# Patient Record
Sex: FEMALE | Race: BLACK OR AFRICAN AMERICAN | HISPANIC OR LATINO | URBAN - METROPOLITAN AREA
[De-identification: names, ages, dates, MRNs, and addresses within clinical notes are randomized per-mention and may not be internally consistent; named-entity substitution may affect disease eponyms.]

---

## 2023-08-08 ENCOUNTER — EMERGENCY (EMERGENCY)
Facility: HOSPITAL | Age: 28
LOS: 1 days | Discharge: HOME | End: 2023-08-08
Attending: EMERGENCY MEDICINE
Payer: MEDICAID

## 2023-08-08 VITALS
DIASTOLIC BLOOD PRESSURE: 80 MMHG | RESPIRATION RATE: 17 BRPM | OXYGEN SATURATION: 98 % | HEIGHT: 66 IN | TEMPERATURE: 98 F | WEIGHT: 158.07 LBS | SYSTOLIC BLOOD PRESSURE: 117 MMHG | HEART RATE: 104 BPM

## 2023-08-08 LAB
ALBUMIN SERPL ELPH-MCNC: 3.4 G/DL — LOW (ref 3.5–5)
ALP SERPL-CCNC: 73 U/L — SIGNIFICANT CHANGE UP (ref 40–120)
ALT FLD-CCNC: 45 U/L DA — SIGNIFICANT CHANGE UP (ref 10–60)
ANION GAP SERPL CALC-SCNC: 6 MMOL/L — SIGNIFICANT CHANGE UP (ref 5–17)
AST SERPL-CCNC: 23 U/L — SIGNIFICANT CHANGE UP (ref 10–40)
BASOPHILS # BLD AUTO: 0.01 K/UL — SIGNIFICANT CHANGE UP (ref 0–0.2)
BASOPHILS NFR BLD AUTO: 0.2 % — SIGNIFICANT CHANGE UP (ref 0–2)
BILIRUB SERPL-MCNC: 0.2 MG/DL — SIGNIFICANT CHANGE UP (ref 0.2–1.2)
BUN SERPL-MCNC: 5 MG/DL — LOW (ref 7–18)
CALCIUM SERPL-MCNC: 8.1 MG/DL — LOW (ref 8.4–10.5)
CHLORIDE SERPL-SCNC: 107 MMOL/L — SIGNIFICANT CHANGE UP (ref 96–108)
CK SERPL-CCNC: 68 U/L — SIGNIFICANT CHANGE UP (ref 21–215)
CO2 SERPL-SCNC: 28 MMOL/L — SIGNIFICANT CHANGE UP (ref 22–31)
CREAT SERPL-MCNC: 0.61 MG/DL — SIGNIFICANT CHANGE UP (ref 0.5–1.3)
EGFR: 124 ML/MIN/1.73M2 — SIGNIFICANT CHANGE UP
EOSINOPHIL # BLD AUTO: 0.05 K/UL — SIGNIFICANT CHANGE UP (ref 0–0.5)
EOSINOPHIL NFR BLD AUTO: 1.1 % — SIGNIFICANT CHANGE UP (ref 0–6)
ERYTHROCYTE [SEDIMENTATION RATE] IN BLOOD: 27 MM/HR — HIGH (ref 0–15)
GLUCOSE SERPL-MCNC: 76 MG/DL — SIGNIFICANT CHANGE UP (ref 70–99)
HCG SERPL-ACNC: <1 MIU/ML — SIGNIFICANT CHANGE UP
HCT VFR BLD CALC: 37.9 % — SIGNIFICANT CHANGE UP (ref 34.5–45)
HGB BLD-MCNC: 12 G/DL — SIGNIFICANT CHANGE UP (ref 11.5–15.5)
IMM GRANULOCYTES NFR BLD AUTO: 0.2 % — SIGNIFICANT CHANGE UP (ref 0–0.9)
LYMPHOCYTES # BLD AUTO: 2.04 K/UL — SIGNIFICANT CHANGE UP (ref 1–3.3)
LYMPHOCYTES # BLD AUTO: 46.8 % — HIGH (ref 13–44)
MAGNESIUM SERPL-MCNC: 1.9 MG/DL — SIGNIFICANT CHANGE UP (ref 1.6–2.6)
MCHC RBC-ENTMCNC: 26 PG — LOW (ref 27–34)
MCHC RBC-ENTMCNC: 31.7 GM/DL — LOW (ref 32–36)
MCV RBC AUTO: 82.2 FL — SIGNIFICANT CHANGE UP (ref 80–100)
MONOCYTES # BLD AUTO: 0.43 K/UL — SIGNIFICANT CHANGE UP (ref 0–0.9)
MONOCYTES NFR BLD AUTO: 9.9 % — SIGNIFICANT CHANGE UP (ref 2–14)
NEUTROPHILS # BLD AUTO: 1.82 K/UL — SIGNIFICANT CHANGE UP (ref 1.8–7.4)
NEUTROPHILS NFR BLD AUTO: 41.8 % — LOW (ref 43–77)
NRBC # BLD: 0 /100 WBCS — SIGNIFICANT CHANGE UP (ref 0–0)
PLATELET # BLD AUTO: 214 K/UL — SIGNIFICANT CHANGE UP (ref 150–400)
POTASSIUM SERPL-MCNC: 3.6 MMOL/L — SIGNIFICANT CHANGE UP (ref 3.5–5.3)
POTASSIUM SERPL-SCNC: 3.6 MMOL/L — SIGNIFICANT CHANGE UP (ref 3.5–5.3)
PROT SERPL-MCNC: 7.8 G/DL — SIGNIFICANT CHANGE UP (ref 6–8.3)
RBC # BLD: 4.61 M/UL — SIGNIFICANT CHANGE UP (ref 3.8–5.2)
RBC # FLD: 14.7 % — HIGH (ref 10.3–14.5)
SODIUM SERPL-SCNC: 141 MMOL/L — SIGNIFICANT CHANGE UP (ref 135–145)
WBC # BLD: 4.36 K/UL — SIGNIFICANT CHANGE UP (ref 3.8–10.5)
WBC # FLD AUTO: 4.36 K/UL — SIGNIFICANT CHANGE UP (ref 3.8–10.5)

## 2023-08-08 PROCEDURE — 82550 ASSAY OF CK (CPK): CPT

## 2023-08-08 PROCEDURE — 85652 RBC SED RATE AUTOMATED: CPT

## 2023-08-08 PROCEDURE — 96375 TX/PRO/DX INJ NEW DRUG ADDON: CPT

## 2023-08-08 PROCEDURE — 99284 EMERGENCY DEPT VISIT MOD MDM: CPT | Mod: 25

## 2023-08-08 PROCEDURE — 83735 ASSAY OF MAGNESIUM: CPT

## 2023-08-08 PROCEDURE — 96374 THER/PROPH/DIAG INJ IV PUSH: CPT

## 2023-08-08 PROCEDURE — 70450 CT HEAD/BRAIN W/O DYE: CPT | Mod: MA

## 2023-08-08 PROCEDURE — 80053 COMPREHEN METABOLIC PANEL: CPT

## 2023-08-08 PROCEDURE — 36415 COLL VENOUS BLD VENIPUNCTURE: CPT

## 2023-08-08 PROCEDURE — 70450 CT HEAD/BRAIN W/O DYE: CPT | Mod: 26,MA

## 2023-08-08 PROCEDURE — 99284 EMERGENCY DEPT VISIT MOD MDM: CPT

## 2023-08-08 PROCEDURE — 84702 CHORIONIC GONADOTROPIN TEST: CPT

## 2023-08-08 PROCEDURE — 85025 COMPLETE CBC W/AUTO DIFF WBC: CPT

## 2023-08-08 RX ORDER — SODIUM CHLORIDE 9 MG/ML
1000 INJECTION INTRAMUSCULAR; INTRAVENOUS; SUBCUTANEOUS
Refills: 0 | Status: DISCONTINUED | OUTPATIENT
Start: 2023-08-08 | End: 2023-08-12

## 2023-08-08 RX ORDER — METOCLOPRAMIDE HCL 10 MG
10 TABLET ORAL ONCE
Refills: 0 | Status: COMPLETED | OUTPATIENT
Start: 2023-08-08 | End: 2023-08-08

## 2023-08-08 RX ORDER — DIPHENHYDRAMINE HCL 50 MG
25 CAPSULE ORAL ONCE
Refills: 0 | Status: COMPLETED | OUTPATIENT
Start: 2023-08-08 | End: 2023-08-08

## 2023-08-08 RX ADMIN — SODIUM CHLORIDE 125 MILLILITER(S): 9 INJECTION INTRAMUSCULAR; INTRAVENOUS; SUBCUTANEOUS at 19:12

## 2023-08-08 RX ADMIN — SODIUM CHLORIDE 125 MILLILITER(S): 9 INJECTION INTRAMUSCULAR; INTRAVENOUS; SUBCUTANEOUS at 23:38

## 2023-08-08 RX ADMIN — Medication 25 MILLIGRAM(S): at 23:36

## 2023-08-08 RX ADMIN — Medication 104 MILLIGRAM(S): at 23:33

## 2023-08-08 NOTE — ED PROVIDER NOTE - NSFOLLOWUPINSTRUCTIONS_ED_ALL_ED_FT
Cefalea migrañosa  Migraine Headache  Helga cefalea migrañosa es un dolor intenso y punzante en jhonatan o ambos lados de la christophe. Las cefaleas migrañosas también pueden causar otros síntomas, valeria náuseas, vómitos y sensibilidad a la aleah y el ruido. Helga cefalea migrañosa puede durar desde 4 horas hasta 3 días. Hable con villa médico sobre los factores que pueden causar (desencadenar) las cefaleas migrañosas.    ¿Cuáles son las causas?  Se desconoce la causa exacta de esta afección. Sin embargo, helga migraña puede aparecer cuando los nervios del cerebro se irritan y liberan ciertas sustancias químicas que causan inflamación de los vasos sanguíneos. Esta inflamación provoca dolor. Esta afección puede desencadenarse o ser causada por lo siguiente:  Consumo de alcohol.  Consumo de cigarrillos.  Janet medicamentos valeria por ejemplo:  Medicamentos para aliviar el dolor torácico (nitroglicerina).  Anticonceptivos orales.  Estrógeno.  Ciertos medicamentos para la presión arterial.  [place] o beber productos que contienen nitratos, glutamato, aspartamo o tiramina. Los quesos añejados, el chocolate o la cafeína también pueden ser desencadenantes.  Hacer actividad física.  Otros factores que pueden provocar cefalea migrañosa son los siguientes:  Menstruación.  Embarazo.  Hambre.  Estrés.  Dormir poco o dormir demasiado.  Cambios climáticos.  Fatiga.  ¿Qué incrementa el riesgo?  Los siguientes factores pueden hacer que usted sea más propenso a tener migrañas:  Tener cierta edad. Es más probable que esta afección se manifieste en personas que tienen entre 25 y 55 años.  Ser jose alejandro.  Tener antecedentes familiares de cefalea migrañosa.  Ser de chevy caucásica.  Tener helga afección de nixon mental, valeria depresión o ansiedad.  Ser guy.  ¿Cuáles son los signos o los síntomas?  El principal síntoma de esta afección es el dolor intenso y punzante. Shazia dolor puede tener las siguientes características:  Puede aparecer en cualquier región de la christophe, tanto de un lado valeria de ambos.  Puede interferir con las actividades de la guevara cotidiana.  Puede empeorar con la actividad física.  Puede empeorar ante la exposición a luces brillantes o a ruidos amilcar.  Otros síntomas pueden incluir:  Náuseas.  Vómitos.  Mareos.  Sensibilidad general a las luces brillantes, a los ruidos amilcar o a los olores.  Antes de tener helga cefalea migrañosa, puede recibir señales de advertencia (aura). Un aura puede incluir:  Juan luces intermitentes o tener puntos ciegos.  Juan puntos brillantes, halos o líneas en zigzag.  Tener helga visión en túnel o visión borrosa.  Sentir entumecimiento u hormigueo.  Tener dificultad para hablar.  Debilidad muscular.  Algunas personas tienen síntomas después de helga cefalea migrañosa (fase posdromal), valeria los siguientes:  Cansancio.  Dificultad para concentrarte.  ¿Cómo se diagnostica?  La cefalea migrañosa se diagnostica en función de lo siguiente:  Essence síntomas.  Un examen físico.  Pruebas, valeria, por ejemplo:  Tomografía computarizada (TC) o resonancia magnética (RM) de la christophe. Estos estudios de diagnóstico por imágenes pueden ayudar a descartar otras causas de cefalea.  Janet helga muestra de líquido de la médula padilla (punción lumbar) para analizar (análisis de líquido cefalorraquídeo o análisis de LCR).  ¿Cómo se trata?  Esta afección se puede tratar con medicamentos para:  Aliviar el dolor.  Aliviar las náuseas.  Prevenir las cefaleas migrañosas.  El tratamiento de esta afección también puede incluir lo siguiente:  Acupuntura.  Cambios en el estilo de guevara, valeria evitar los alimentos que provocan las cefaleas migrañosas.  Biorretroalimentación.  Terapia cognitiva conductual.  Siga estas instrucciones en villa casa:  Medicamentos    Use los medicamentos de venta binta y los recetados solamente valeria se lo haya indicado el médico.  Pregúntele al médico si el medicamento recetado:  Hace que sea necesario que evite conducir o usar maquinaria pesada.  Puede causarle estreñimiento. Es posible que tenga que janet estas medidas para prevenir o tratar el estreñimiento:  Beber suficiente líquido valeria para mantener la orina de color amarillo pálido.  Janet medicamentos recetados o de venta binta.  Consumir alimentos ricos en fibra, valeria frijoles, cereales integrales, y frutas y verduras frescas.  Limitar el consumo de alimentos ricos en grasa y azúcares procesados, valeria los alimentos fritos o dulces.  Estilo de guevara    No christelle alcohol.  No consuma ningún producto que contenga nicotina o tabaco, valeria cigarrillos, cigarrillos electrónicos y tabaco de mascar. Si necesita ayuda para dejar de fumar, consulte al médico.  Duerma valeria mínimo 8 horas todas las noches.  Encuentre modos de manejar el estrés, por ejemplo, a través de la meditación, la respiración profunda o el yoga.  Indicaciones generales        Lleve un registro diario para averiguar lo que puede provocar las cefaleas migrañosas. Registre, por ejemplo, lo siguiente:  Lo que usted come y vahe.  El tiempo que duerme.  Algún cambio en villa dieta o en los medicamentos.  Si tiene helga cefalea migrañosa:  Evite los factores que empeoren los síntomas, valeria las luces brillantes.  Resulta útil acostarse en helga habitación oscura y silenciosa.  No conduzca vehículos ni opere maquinaria pesada.  Pregúntele al médico qué actividades son seguras para usted cuando tiene síntomas.  Concurra a todas las visitas de seguimiento valeria se lo haya indicado el médico. [place] es importante.  Comuníquese con un médico si:  Tiene síntomas de cefalea migrañosa que son distintos o más intensos que los habituales.  Tiene más de 15 días de cefalea por mes.  Solicite ayuda inmediatamente si:  La cefalea migrañosa se vuelve cada vez más intensa.  La cefalea migrañosa dura más de 72 horas.  Tiene fiebre.  Presenta rigidez en el juany.  Presenta pérdida de la visión.  Siente debilidad en los músculos o que no puede controlarlos.  Comienza a perder el equilibrio con frecuencia.  Presenta dificultad para caminar.  Se desmaya.  Tiene helga convulsión.  Resumen  Helga cefalea migrañosa es un dolor intenso y punzante en jhonatan o ambos lados de la christophe. Las migrañas también pueden causar otros síntomas, valeria náuseas, vómitos y sensibilidad a la aleah y el ruido.  Esta afección puede tratarse con medicamentos y cambios en el estilo de guevara. También es posible que deba evitar ciertos factores que desencadenan helga cefalea migrañosa.  Lleve un registro diario para averiguar lo que puede provocar las cefaleas migrañosas.  Comuníquese con el médico si tiene más de 15 días de cefalea en un mes o presenta síntomas de cefalea migrañosa que son distintos o más intensos que los habituales.  Esta información no tiene valeria fin reemplazar el consejo del médico. Asegúrese de hacerle al médico cualquier pregunta que tenga. Cefalea migrañosa  Migraine Headache  Helga cefalea migrañosa es un dolor intenso y punzante en jhonatan o ambos lados de la christophe. Las cefaleas migrañosas también pueden causar otros síntomas, valeria náuseas, vómitos y sensibilidad a la aleah y el ruido. Helga cefalea migrañosa puede durar desde 4 horas hasta 3 días. Hable con villa médico sobre los factores que pueden causar (desencadenar) las cefaleas migrañosas.    ¿Cuáles son las causas?  Se desconoce la causa exacta de esta afección. Sin embargo, helga migraña puede aparecer cuando los nervios del cerebro se irritan y liberan ciertas sustancias químicas que causan inflamación de los vasos sanguíneos. Esta inflamación provoca dolor. Esta afección puede desencadenarse o ser causada por lo siguiente:  Consumo de alcohol.  Consumo de cigarrillos.  Janet medicamentos valeria por ejemplo:  Medicamentos para aliviar el dolor torácico (nitroglicerina).  Anticonceptivos orales.  Estrógeno.  Ciertos medicamentos para la presión arterial.  [place] o beber productos que contienen nitratos, glutamato, aspartamo o tiramina. Los quesos añejados, el chocolate o la cafeína también pueden ser desencadenantes.  Hacer actividad física.  Otros factores que pueden provocar cefalea migrañosa son los siguientes:  Menstruación.  Embarazo.  Hambre.  Estrés.  Dormir poco o dormir demasiado.  Cambios climáticos.  Fatiga.  ¿Qué incrementa el riesgo?  Los siguientes factores pueden hacer que usted sea más propenso a tener migrañas:  Tener cierta edad. Es más probable que esta afección se manifieste en personas que tienen entre 25 y 55 años.  Ser jose alejandro.  Tener antecedentes familiares de cefalea migrañosa.  Ser de chevy caucásica.  Tener helga afección de nixon mental, valeria depresión o ansiedad.  Ser guy.  ¿Cuáles son los signos o los síntomas?  El principal síntoma de esta afección es el dolor intenso y punzante. Shazia dolor puede tener las siguientes características:  Puede aparecer en cualquier región de la christophe, tanto de un lado valeria de ambos.  Puede interferir con las actividades de la guevara cotidiana.  Puede empeorar con la actividad física.  Puede empeorar ante la exposición a luces brillantes o a ruidos amilcar.  Otros síntomas pueden incluir:  Náuseas.  Vómitos.  Mareos.  Sensibilidad general a las luces brillantes, a los ruidos amilcar o a los olores.  Antes de tener helga cefalea migrañosa, puede recibir señales de advertencia (aura). Un aura puede incluir:  Juan luces intermitentes o tener puntos ciegos.  Juan puntos brillantes, halos o líneas en zigzag.  Tener helga visión en túnel o visión borrosa.  Sentir entumecimiento u hormigueo.  Tener dificultad para hablar.  Debilidad muscular.  Algunas personas tienen síntomas después de helga cefalea migrañosa (fase posdromal), valeria los siguientes:  Cansancio.  Dificultad para concentrarte.  ¿Cómo se diagnostica?  La cefalea migrañosa se diagnostica en función de lo siguiente:  Essence síntomas.  Un examen físico.  Pruebas, valeria, por ejemplo:  Tomografía computarizada (TC) o resonancia magnética (RM) de la christophe. Estos estudios de diagnóstico por imágenes pueden ayudar a descartar otras causas de cefalea.  Janet helga muestra de líquido de la médula padilla (punción lumbar) para analizar (análisis de líquido cefalorraquídeo o análisis de LCR).  ¿Cómo se trata?  Esta afección se puede tratar con medicamentos para:  Aliviar el dolor.  Aliviar las náuseas.  Prevenir las cefaleas migrañosas.  El tratamiento de esta afección también puede incluir lo siguiente:  Acupuntura.  Cambios en el estilo de guevara, valeria evitar los alimentos que provocan las cefaleas migrañosas.  Biorretroalimentación.  Terapia cognitiva conductual.  Siga estas instrucciones en villa casa:  Medicamentos    Use los medicamentos de venta binta y los recetados solamente valeria se lo haya indicado el médico.  Pregúntele al médico si el medicamento recetado:  Hace que sea necesario que evite conducir o usar maquinaria pesada.  Puede causarle estreñimiento. Es posible que tenga que janet estas medidas para prevenir o tratar el estreñimiento:  Beber suficiente líquido valeria para mantener la orina de color amarillo pálido.  Janet medicamentos recetados o de venta binta.  Consumir alimentos ricos en fibra, valeria frijoles, cereales integrales, y frutas y verduras frescas.  Limitar el consumo de alimentos ricos en grasa y azúcares procesados, valeria los alimentos fritos o dulces.  Estilo de guevara    No nicky alcohol.  No consuma ningún producto que contenga nicotina o tabaco, valeria cigarrillos, cigarrillos electrónicos y tabaco de mascar. Si necesita ayuda para dejar de fumar, consulte al médico.  Duerma valeria mínimo 8 horas todas las noches.  Encuentre modos de manejar el estrés, por ejemplo, a través de la meditación, la respiración profunda o el yoga.  Indicaciones generales        Lleve un registro diario para averiguar lo que puede provocar las cefaleas migrañosas. Registre, por ejemplo, lo siguiente:  Lo que usted come y vahe.  El tiempo que duerme.  Algún cambio en villa dieta o en los medicamentos.  Si tiene helga cefalea migrañosa:  Evite los factores que empeoren los síntomas, valeria las luces brillantes.  Resulta útil acostarse en helga habitación oscura y silenciosa.  No conduzca vehículos ni opere maquinaria pesada.  Pregúntele al médico qué actividades son seguras para usted cuando tiene síntomas.  Concurra a todas las visitas de seguimiento valreia se lo haya indicado el médico. [place] es importante.  Comuníquese con un médico si:  Tiene síntomas de cefalea migrañosa que son distintos o más intensos que los habituales.  Tiene más de 15 días de cefalea por mes.  Solicite ayuda inmediatamente si:  La cefalea migrañosa se vuelve cada vez más intensa.  La cefalea migrañosa dura más de 72 horas.  Tiene fiebre.  Presenta rigidez en el juany.  Presenta pérdida de la visión.  Siente debilidad en los músculos o que no puede controlarlos.  Comienza a perder el equilibrio con frecuencia.  Presenta dificultad para caminar.  Se desmaya.  Tiene helga convulsión.  Resumen  Helga cefalea migrañosa es un dolor intenso y punzante en jhonatan o ambos lados de la christophe. Las migrañas también pueden causar otros síntomas, valeria náuseas, vómitos y sensibilidad a la aleah y el ruido.  Esta afección puede tratarse con medicamentos y cambios en el estilo de guevara. También es posible que deba evitar ciertos factores que desencadenan helga cefalea migrañosa.  Lleve un registro diario para averiguar lo que puede provocar las cefaleas migrañosas.  Comuníquese con el médico si tiene más de 15 días de cefalea en un mes o presenta síntomas de cefalea migrañosa que son distintos o más intensos que los habituales.  Esta información no tiene valeria fin reemplazar el consejo del médico. Asegúrese de hacerle al médico cualquier pregunta que tenga.      Laberintitis  Labyrinthitis  Ear anatomy showing labyrinth structures.  La laberintitis es helga infección en el oído interno. El oído interno es un sistema de conductos y cavidades (laberinto) que está lleno de líquido. En el oído interno hay células nerviosas que envían señales para la audición y el equilibrio al cerebro. Cuando en los conductos y gonzales se introducen microbios diminutos (microorganismos), estos dañan las neuronas que envían mensajes al cerebro. [place] puede provocar cambios en la audición y el equilibrio.    La laberintitis generalmente comienza de manera súbita y desaparece con tratamiento en pocas semanas (laberintitis aguda). Si la infección daña partes del laberinto, algunos síntomas pueden permanecer por mucho tiempo (laberintitis crónica).    ¿Cuáles son las causas?  La causa de la laberintitis pueden ser virus, valeria el que causa:  Mononucleosis infecciosa, también llamada "mono".  Sarampión o paperas.  Gripe (influenza).  Herpes.  Bacterias que se propagan desde helga infección en el cerebro o el oído medio también pueden causar laberintitis (laberintitis purulenta). En algunos casos, las bacterias pueden producir un veneno (toxina) que penetra en el laberinto (laberintitis serosa).    ¿Qué incrementa el riesgo?  Puede estar expuesto a un riesgo mayor de tener laberintitis si:  Recientemente tuvo helga infección en la boca, la nariz o la garganta (infección de las vías respiratorias superiores) o helga infección en el oído.  Vahe mucho alcohol.  Fuma.  [place] determinados medicamentos.  Se siente cansado (fatigado).  Está experimentando mucho estrés.  Tiene alergias.  ¿Cuáles son los signos o síntomas?  Generalmente, los síntomas de laberintitis comienzan de manera repentina. Los síntomas pueden variar de leves a graves, y pueden incluir:  Mareos.  Pérdida auditiva.  Helga sensación de que usted o todo lo que lo rodea se mueve cuando en realidad eso no sucede (vértigo).  Zumbidos en los oídos (tinnitus).  Náuseas y vómitos.  Dificultad para enfocar los ojos.  Los síntomas de laberintitis crónica pueden incluir lo siguiente:  Fatiga.  Confusión.  Pérdida auditiva.  Acúfenos (tinnitus).  Falta de equilibrio.  Vértigo después de realizar movimientos bruscos con la christophe.  ¿Cómo se diagnostica?  Esta afección se puede diagnosticar en función de lo siguiente:  Los síntomas y los antecedentes médicos. El médico puede hacerle preguntas sobre cualquier mareo o pérdida de la audición que tenga y sobre las infecciones de las vías respiratorias superiores que haya tenido recientemente.  Un examen físico que incluye:  Un examen de los oídos para detectar helga infección.  Helga prueba de equilibrio.  Controlar essence movimientos oculares.  Pruebas de audición.  Pruebas de diagnóstico por imágenes, valeria helga exploración por tomografía computarizada (TC) o helga resonancia magnética (RM).  Estudios de los movimientos oculares (electronistagmografía, o ENG).  ¿Cómo se trata?  El tratamiento depende de la causa. Pueden administrarle antibióticos, si la causa de villa enfermedad es helga bacteria. Si la causa de villa enfermedad es un virus, puede mejorar por sí pilo.    Cualquiera sea la causa, el tratamiento puede incluir:  Medicamentos para:  Detener los mareos.  Aliviar las náuseas.  Reducir la inflamación.  Acelerar la recuperación.  Líquidos intravenosos (i.v.) Estos pueden administrarse en un hospital. Es posible que necesite líquidos por vía intravenosa si tiene náuseas y vómitos graves.  Fisioterapia. El terapeuta puede enseñarle ejercicios para ayudarlo a que se adapte a la sensación de mareo (ejercicios de rehabilitación vestibular). Puede necesitar esto si essence mareos no desaparecen.  Siga estas instrucciones en villa casa:  Medicamentos    Use los medicamentos de venta binta y los recetados solamente valeria se lo haya indicado el médico.  Si le recetaron un antibiótico, tómelo valeria se lo haya indicado el médico. No deje de janet el antibiótico aunque comience a sentirse mejor.  Actividad    Prem reposo valeria se lo haya indicado el médico.  Limite villa actividad según las indicaciones. Pregúntele al médico qué actividades son seguras para usted.  No prem movimientos repentinos hasta no tener más mareos.  Si le indicaron fisioterapia, prem los ejercicios valeria se lo hayan indicado.  Indicaciones generales    Evite los ruidos amilcar y las luces brillantes.  No conduzca hasta que villa médico le diga que es seguro hacerlo.  [place] suficiente líquido valeria para mantener la orina de color amarillo pálido.  Concurra a todas las visitas de seguimiento. [place] es importante.  Comuníquese con un médico si tiene:  Síntomas que no mejoran con los medicamentos.  Síntomas que se prolongan divya más de 2 semanas.  Fiebre.  Solicite ayuda de inmediato si tiene:  Náuseas o vómitos que son graves o no desaparecen.  Mareos intensos.  Helga pérdida de la audición repentina.  Resumen  La laberintitis es helga infección del oído interno. Puede provocar cambios en la audición y el equilibrio, o vértigo.  Los síntomas generalmente comienzan de manera repentina e incluyen mareos, pérdida de la audición, náuseas y vómitos. También puede tener zumbidos en el oído (acúfenos), problemas para enfocar los ojos y vértigo.  Si la afección dura más que un par de semanas, los síntomas pueden incluir fatiga, confusión, pérdida de la audición, problemas de equilibrio, tinnitus y vértigo.  El tratamiento depende de la causa. Pueden administrarle antibióticos, si la causa de la laberintitis es helga bacteria. Si la causa de la laberintitis es un virus, puede mejorar por sí pilo.  Siga las instrucciones del médico acerca de cómo janet los medicamentos, qué actividades debe evitar y cuándo solicitar ayuda médica.  Esta información no tiene valeria fin reemplazar el consejo del médico. Asegúrese de hacerle al médico cualquier pregunta que tenga.    You need to follow up with your medical doctor  Also neurology if no improvement

## 2023-08-08 NOTE — ED PROVIDER NOTE - CLINICAL SUMMARY MEDICAL DECISION MAKING FREE TEXT BOX
Pt with migraine HA, also Labyrinthitis, give h/o HIV, will get labs., CT head, give meds., reassess

## 2023-08-08 NOTE — ED ADULT NURSE NOTE - CHIEF COMPLAINT QUOTE
headache for 2 weeks, patient is diagnosed with HIV and cervical cancer and doesn't want her daughter to know.

## 2023-08-08 NOTE — ED PROVIDER NOTE - PROGRESS NOTE DETAILS
Labs/CT head explained to pt  Pt denies any sinus problems pt slept, felt much better, ambulating with no ataxia.  Will d/c home

## 2023-08-08 NOTE — ED ADULT TRIAGE NOTE - CHIEF COMPLAINT QUOTE
headache for 2 weeks headache for 2 weeks, patient is diagnosed with HIV and cervical cancer and doesn't want her daughter to know.

## 2023-08-08 NOTE — ED ADULT NURSE NOTE - DISCHARGE DATE/TIME
Area H Indication Text: Tumors in this location are included in Area H (eyelids, eyebrows, nose, lips, chin, ear, pre-auricular, post-auricular, temple, genitalia, hands, feet, ankles and areola).  Tissue conservation is critical in these anatomic locations. 09-Aug-2023 06:31

## 2023-08-08 NOTE — ED ADULT NURSE REASSESSMENT NOTE - NS ED NURSE REASSESS COMMENT FT1
Pt received from previous shift, alert and oriented x3. No sign of acute distress noted. Family at the bedside. Safety maintained.

## 2023-08-08 NOTE — ED ADULT NURSE NOTE - NSFALLUNIVINTERV_ED_ALL_ED
Bed/Stretcher in lowest position, wheels locked, appropriate side rails in place/Call bell, personal items and telephone in reach/Instruct patient to call for assistance before getting out of bed/chair/stretcher/Non-slip footwear applied when patient is off stretcher/[place] to call system/Physically safe environment - no spills, clutter or unnecessary equipment/Purposeful proactive rounding/Room/bathroom lighting operational, light cord in reach

## 2023-08-08 NOTE — ED PROVIDER NOTE - OBJECTIVE STATEMENT
[id number] [place]  27-year-old female LMP 7/28, diagnosed with HIV 2 years ago, also recently diagnosed with cervical CA, patient for hysterectomy on 8/30.  Pt c/o constant right-sided headache for past 9 days, photophobia, phonophobia, nauseous/vomited first day. Pt also c/o dizziness, vertigo since yesterday, ataxia.  Patient claims she had a cold from Thursday to Saturday.  Patient with similar headache 1 year ago took Excedrin Migraine with relief but not this past few days

## 2023-08-09 VITALS
DIASTOLIC BLOOD PRESSURE: 65 MMHG | OXYGEN SATURATION: 98 % | SYSTOLIC BLOOD PRESSURE: 110 MMHG | HEART RATE: 62 BPM | TEMPERATURE: 98 F | RESPIRATION RATE: 16 BRPM

## 2023-08-09 RX ORDER — ONDANSETRON 8 MG/1
1 TABLET, FILM COATED ORAL
Qty: 16 | Refills: 0
Start: 2023-08-09 | End: 2023-08-12

## 2023-08-09 RX ORDER — MECLIZINE HCL 12.5 MG
1 TABLET ORAL
Qty: 16 | Refills: 0
Start: 2023-08-09 | End: 2023-08-12
